# Patient Record
Sex: MALE
[De-identification: names, ages, dates, MRNs, and addresses within clinical notes are randomized per-mention and may not be internally consistent; named-entity substitution may affect disease eponyms.]

---

## 2020-04-21 ENCOUNTER — APPOINTMENT (OUTPATIENT)
Dept: ORTHOPEDIC SURGERY | Facility: CLINIC | Age: 70
End: 2020-04-21
Payer: MEDICARE

## 2020-04-21 VITALS — BODY MASS INDEX: 21.87 KG/M2 | HEIGHT: 73 IN | WEIGHT: 165 LBS

## 2020-04-21 VITALS — HEIGHT: 73 IN | WEIGHT: 165 LBS | BODY MASS INDEX: 21.87 KG/M2

## 2020-04-21 DIAGNOSIS — Z72.3 LACK OF PHYSICAL EXERCISE: ICD-10-CM

## 2020-04-21 DIAGNOSIS — Z78.9 OTHER SPECIFIED HEALTH STATUS: ICD-10-CM

## 2020-04-21 DIAGNOSIS — M67.911 UNSPECIFIED DISORDER OF SYNOVIUM AND TENDON, RIGHT SHOULDER: ICD-10-CM

## 2020-04-21 DIAGNOSIS — Z87.438 PERSONAL HISTORY OF OTHER DISEASES OF MALE GENITAL ORGANS: ICD-10-CM

## 2020-04-21 DIAGNOSIS — M67.921 UNSPECIFIED DISORDER OF SYNOVIUM AND TENDON, RIGHT UPPER ARM: ICD-10-CM

## 2020-04-21 PROBLEM — Z00.00 ENCOUNTER FOR PREVENTIVE HEALTH EXAMINATION: Status: ACTIVE | Noted: 2020-04-21

## 2020-04-21 PROCEDURE — 99203 OFFICE O/P NEW LOW 30 MIN: CPT | Mod: 95

## 2020-04-21 NOTE — HISTORY OF PRESENT ILLNESS
[Medical Office: ([facility])___] : at the medical office located in  [Home] : at home, [unfilled] , at the time of the visit. [Patient] : the patient [FreeTextEntry2] : The patient was sent a copy of the Telehealth consent form to review and understands and  agrees to proceed. [de-identified] : Mr. Cronin is a 69-year-old Right-hand dominant gentleman who presents for evaluation of his RIGHT shoulder/ Upper arm which began hurting April 19, 2021. There was no inciting event or injury. The pain just started out of nowhere. He has not had any shoulder problems and over 20 years ago. More than 20 years ago he did have frozen shoulder in both shoulders which resolved completely. \par He has relatively constant sharp pain that is throbbing and about 8/10 worse with movement and better with Tylenol and rest. He hasn't had any workup or treatment.\par He applied ice. Pain is much more severe at night when he is in bed and trying to sleep. It wakes him up multiple times in the night. Last night he woke up at midnight and took 2 Advil and then woke up at 3 AM and took 2 Tylenol and took another Tylenol when he woke up in the morning. When he is up moving around the pain is better than it is when he is lying down. He has difficulty reaching his mouth with the RIGHT hand because bending the RIGHT elbow fully he is somewhat limited and painful.\par Normally he sleeps on his RIGHT side which is painful. He occasionally feels a slight twinge around the thumb area. No significant neck pain or if she is up proximally at the neck.\par He has been isolating at home because of corona virus

## 2020-04-21 NOTE — PHYSICAL EXAM
[Normal RUE] : Right Upper Extremity: No scars, rashes, lesions, ulcers, skin intact [Normal LUE] : Left Upper Extremity: No scars, rashes, lesions, ulcers, skin intact [Normal Touch] : sensation intact for touch [Normal] : Gait: normal [UE] : Sensory: Intact in bilateral upper extremities [de-identified] : RIGHT shoulder and UE/ LEFT shoulder for comparison\par Cervical spine is without tenderness . Cervical spine range of motion is with some stiffness but no pain\par Normal appearance Upper extremities. There is a mole in the antecubital fossa on the RIGHT. No erythema, ecchymoses or visible edema or deformity.. \par AROM Bilateral shoulders: 180 FE, IR to T10 , 180 abd. Mild pain with full elevation.\par With his arm in 90° abduction and internal rotation similar to a Vanegas maneuver he has pain on the RIGHT and limited internal rotation in this position compared to the LEFT where there is no pain and better internal rotation.\par  -  X-arm.   \par He can do a lift off.\par He can actively flex and extend the elbow but range of motion on the RIGHT is with full extension to about 140° flexion versus 150° flexion on the LEFT.\par Tender over the Proximal biceps tendon And biceps muscle and the anterior shoulder.  Nontender distally around the elbow or antecubital fossa.\par No obvious deformity of the biceps such as a Chintan deformity although difficult to see.\par Full flexion and extension of the fingers.\par Sensation is intact distally in the UE.\par Skin is intact in the UE. \par  [de-identified] : No respiratory distress or coughing

## 2020-04-21 NOTE — ASSESSMENT
[FreeTextEntry1] : 69-year-old RIGHT upper arm pain and proximal to mid biceps and likely some bursitis and Rotator cuff / biceps tendinopathy. There was no injury. It is not unusual as he took a told her to get degeneration and tendons and to get degenerative tears. He did not have any specific trauma. I do not see an obvious proximal long head of the biceps tear based on the exam.\par He could have some calcific tendinopathy which could cause the more intense pain that He is experiencing. It is increased pain at night and lying down is very typical of shoulder problems and general\par I suggested taking the ibuprofen on a regular basis 400-600 mg t.i.d. with meals and then taking 2 Tylenol before bedtime and one other dose if needed.Heat and ice. He can use topical ligaments if they're helpful. Sometimes sleeping more  hugging the pillow can be helpful. If this isn't getting better in the next week or 2 then it may benefit him to come in and get x-rays and to evaluate further.\par Physical therapy would likely be helpful but it causes of the crown of iris I would not have him go to physical therapy right now.